# Patient Record
Sex: MALE | Race: WHITE | URBAN - METROPOLITAN AREA
[De-identification: names, ages, dates, MRNs, and addresses within clinical notes are randomized per-mention and may not be internally consistent; named-entity substitution may affect disease eponyms.]

---

## 2021-04-23 ENCOUNTER — TELEPHONE (OUTPATIENT)
Dept: CARDIOLOGY CLINIC | Age: 26
End: 2021-04-23

## 2021-04-23 ENCOUNTER — OFFICE VISIT (OUTPATIENT)
Dept: CARDIOLOGY CLINIC | Age: 26
End: 2021-04-23
Payer: COMMERCIAL

## 2021-04-23 VITALS
SYSTOLIC BLOOD PRESSURE: 140 MMHG | RESPIRATION RATE: 14 BRPM | HEIGHT: 71 IN | BODY MASS INDEX: 27.72 KG/M2 | OXYGEN SATURATION: 99 % | WEIGHT: 198 LBS | HEART RATE: 95 BPM | DIASTOLIC BLOOD PRESSURE: 70 MMHG

## 2021-04-23 DIAGNOSIS — R55 VASOVAGAL SYNCOPE: Primary | ICD-10-CM

## 2021-04-23 DIAGNOSIS — R94.31 ABNORMAL EKG: ICD-10-CM

## 2021-04-23 DIAGNOSIS — Z09 HOSPITAL DISCHARGE FOLLOW-UP: ICD-10-CM

## 2021-04-23 DIAGNOSIS — R03.0 ELEVATED BP WITHOUT DIAGNOSIS OF HYPERTENSION: ICD-10-CM

## 2021-04-23 PROCEDURE — 99204 OFFICE O/P NEW MOD 45 MIN: CPT | Performed by: SPECIALIST

## 2021-04-23 PROCEDURE — 1111F DSCHRG MED/CURRENT MED MERGE: CPT | Performed by: SPECIALIST

## 2021-04-23 RX ORDER — ASCORBIC ACID 500 MG
500 TABLET ORAL DAILY
COMMUNITY

## 2021-04-23 RX ORDER — LOSARTAN POTASSIUM 25 MG/1
1 TABLET ORAL DAILY
COMMUNITY
Start: 2021-04-15 | End: 2021-05-12

## 2021-04-23 NOTE — TELEPHONE ENCOUNTER
----- Message from Bhargavi Polo RN sent at 4/23/2021 11:55 AM EDT -----  Can you order a 14 day loop for syncope?

## 2021-04-23 NOTE — PROGRESS NOTES
Aniyah Kurtz     1995       Kaitlin Samuel MD, [facility] [place]  Date of Visit-4/23/2021   PCP is Kwaku Solomon MD   [facility] [place]  [facility]  HPI:  Aniyah Kurtz is a 30 y.o. male   An EKG on 4/15/21 showed voltage criteria for LVH but not that is not readable for someone under 28. Had syncope in early April. Had been dehydrated, had elevated BP, and was put on Losartan. He saw Dr. Jose Thomas. Had a HGB of 15, creatinine of 1. . Pt had gone to [address] [place] [address] after starting Losartan on 4/16/21. Lab work was similar. EKG showed SR and had normal troponins. Pt is accompanied by his parents. Overall the pt states he is doing well. He had a syncopal episode the Monday after Easter. He was standing talking to coworkers and started to feel dizzy accompanied by tunnel vision and sweating. He does not remember falling but woke up a few seconds later with a bump on his head. He then went to his office to lay down with his feet above his head. He reports that he felt foggy after the episode. He states that the weekend before the episode he had been drinking more alcohol than normal and was not drinking as much water. He played soccer in college and after a game he felt a similar presyncopal episode when he was in a hot shower. He was also dehydrated that day. He notes he gets redness in his legs after standing for long periods. He exercises 3-4x a week. He uses a [employer] and plays indoor soccer. Home BP's run 107-131, average is about 120. He has not gotten the COVID vaccine. Denies chest pain, edema, or shortness of breath at rest, has no tachycardia, palpitations or sense of arrhythmia. Assessment/Plan:     The primary encounter diagnosis was Vasovagal syncope. Diagnoses of Abnormal EKG, Hospital discharge follow-up, and Elevated BP without diagnosis of hypertension were also pertinent to this visit. 1. Likely vasovagal syncope  He had a good prodrome.  We went over the mechanisms. I will get an echo to make sure he doesn't have athletic heart. 2. LVH is not diagnostic on an EKG before the age of 28.     3. Once I get an echo I will have him stop Losartan and keep a home diary. Will also get a loop monitor and have him f/u in 1 month. F/u in 3-4 weeks  Patient Instructions   You have been scheduled for an exercise nuclear stress test, an echocardiogram and a loop monitor has been ordered for you and will be mailed to your home. Nuclear:    Please arrive 15 minutes prior to your appointment. Wear comfortable clothing and walking or athletic shoes. Do not eat or drink anything, except water, for at least 2 hours prior to your appointment. Avoid tobacco products for at least 6 hours prior to your test.    Do not eat or drink anything containing caffeine, including but not limited to the following: chocolate, regular and decaffeinated coffee, soft drinks, or tea for at least 12-24 hours prior to your test.    Do not hold your scheduled medications prior to your test.     Your test will be performed on a 1 day protocol. This is determined by your height, weight, and other risk factors. For a 1 day test, please allow for 4 hours in the office that day. No future appointments. Impression:   1. Vasovagal syncope    2. Abnormal EKG    3. Hospital discharge follow-up    4. Elevated BP without diagnosis of hypertension       Cardiac History:   No specialty comments available. He goes by [employer] and  he lives in [place] he has been on losartan since April 16. He has no allergies to meds  He had a reaction to losartan with the emergency room on 4/16/2021. No prior cath blood transfusion ulcer or GERD. Social history = non-smoker drinks social alcohol 1 cup of coffee daily works a  enjoys sports and lives with his parents. Family history parents and brothers are in good health he has 1 sister who is a type I diabetic.     Review of systems positive for passing out fainted    Records from [facility] from 4/16/2021 are reviewed including lab work and testing. EKG is also reviewed he also has lab work with him from 4/9/2021 with  which is reviewed and to be scanned given EKG from 4/15/2021 that shows left atrial enlargement and LVH in the report but that again in this age group is not diagnostic and then have Dr. Eusebio Cordero note from 4/15/2021 which is reviewed. ROS-except as noted above. . A complete cardiac and respiratory are reviewed and negative except as above ; Resp-denies wheezing  or productive cough,. Const- No unusual weight loss or fever; Neuro-no recent seizure or CVA ; GI- No BRBPR, abdom pain, bloating ; - no  hematuria   see supplement sheet, initialed and to be scanned by staff  No past medical history on file. Social Hx= reports that he has never smoked. His smokeless tobacco use includes chew. He reports previous alcohol use. He reports that he does not use drugs. Exam and Labs:  BP (!) 140/70 (BP 1 Location: Right arm, BP Patient Position: Sitting, BP Cuff Size: Adult)   Pulse 95   Resp 14   Ht 5' 11\" (1.803 m)   Wt 198 lb (89.8 kg)   SpO2 99%   BMI 27.62 kg/m² Constitutional:  NAD, comfortable  Head: NC,AT. Eyes: No scleral icterus. Neck: no bruit  Neck supple. No JVD present. Throat: moist mucous membranes. Chest: Effort normal & normal respiratory excursion . Neurological: alert, conversant and oriented . Skin: Skin is not cold. No obvious systemic rash noted. Not diaphoretic. No erythema. Psychiatric:  Grossly normal mood and affect. Behavior appears normal. Extremities:  no clubbing or cyanosis. Abdomen: normal palpable mid-line aorta non distended    Lungs:breath sounds normal. No stridor. distress, wheezes or  Rales. Heart: normal rate, regular rhythm, normal S1, S2, no murmurs, rubs, clicks or gallops , PMI non displaced. Edema: Edema is none.   No results found for: CHOL, [address], [facility], [address], HDL, HDLP, LDL, LDLC, DLDLP, TGLX, TRIGL, TRIGP, CHHD, CHHDX  No results found for: NA, K, CL, CO2, AGAP, GLU, BUN, CREA, BUCR, GFRAA, GFRNA, CA, GFRAA   Wt Readings from Last 3 Encounters:   04/23/21 198 lb (89.8 kg)      BP Readings from Last 3 Encounters:   04/23/21 (!) 140/70      Current Outpatient Medications   Medication Sig    losartan (COZAAR) 25 mg tablet Take 1 Tab by mouth daily.  ascorbic acid, vitamin C, (Vitamin C) 500 mg tablet Take 500 mg by mouth daily. Held for the last week but plans to resume     No current facility-administered medications for this visit. Impression see above.       Written by Tete Sharpe, as dictated by Fatemeh Escalante MD.

## 2021-04-23 NOTE — PATIENT INSTRUCTIONS
You have been scheduled for an exercise nuclear stress test, an echocardiogram and a loop monitor has been ordered for you and will be mailed to your home. Nuclear: 
 
Please arrive 15 minutes prior to your appointment. Wear comfortable clothing and walking or athletic shoes. Do not eat or drink anything, except water, for at least 2 hours prior to your appointment. Avoid tobacco products for at least 6 hours prior to your test. 
 
Do not eat or drink anything containing caffeine, including but not limited to the following: chocolate, regular and decaffeinated coffee, soft drinks, or tea for at least 12-24 hours prior to your test. 
 
Do not hold your scheduled medications prior to your test.  
 
Your test will be performed on a 1 day protocol. This is determined by your height, weight, and other risk factors. For a 1 day test, please allow for 4 hours in the office that day.

## 2021-04-23 NOTE — Clinical Note
4/23/2021 Patient: Apple Ruiz YOB: 1995 Date of Visit: 4/23/2021 Amy Walls MD 
[place] [id number] Via Fax: [phone] Dear Amy Walls MD, Thank you for referring Mr. Apple Ruiz to [address] [place] [address] for evaluation. My notes for this consultation are attached. If you have questions, please do not hesitate to call me. I look forward to following your patient along with you.  
 
 
Sincerely, 
 
Magdalena Jewell MD

## 2021-05-11 ENCOUNTER — ANCILLARY PROCEDURE (OUTPATIENT)
Dept: CARDIOLOGY CLINIC | Age: 26
End: 2021-05-11

## 2021-05-11 ENCOUNTER — ANCILLARY PROCEDURE (OUTPATIENT)
Dept: CARDIOLOGY CLINIC | Age: 26
End: 2021-05-11
Payer: COMMERCIAL

## 2021-05-11 VITALS
WEIGHT: 198 LBS | BODY MASS INDEX: 27.72 KG/M2 | SYSTOLIC BLOOD PRESSURE: 130 MMHG | DIASTOLIC BLOOD PRESSURE: 78 MMHG | HEIGHT: 71 IN

## 2021-05-11 VITALS — HEIGHT: 71 IN | WEIGHT: 198 LBS | BODY MASS INDEX: 27.72 KG/M2

## 2021-05-11 DIAGNOSIS — I49.9 IRREGULAR HEART RATE: ICD-10-CM

## 2021-05-11 DIAGNOSIS — R55 SYNCOPE, UNSPECIFIED SYNCOPE TYPE: ICD-10-CM

## 2021-05-11 LAB
ECHO AO ROOT DIAM: 3.34 CM
ECHO AV AREA PEAK VELOCITY: 4.7 CM2
ECHO AV AREA VTI: 5.01 CM2
ECHO AV AREA/BSA PEAK VELOCITY: 2.2 CM2/M2
ECHO AV AREA/BSA VTI: 2.4 CM2/M2
ECHO AV MEAN GRADIENT: 3.33 MMHG
ECHO AV PEAK GRADIENT: 6.18 MMHG
ECHO AV PEAK VELOCITY: 124.3 CM/S
ECHO AV VTI: 22.87 CM
ECHO LA AREA 4C: 20.95 CM2
ECHO LA MAJOR AXIS: 4.17 CM
ECHO LA MINOR AXIS: 1.99 CM
ECHO LA VOL 2C: 56.37 ML (ref 18–58)
ECHO LA VOL 4C: 59.85 ML (ref 18–58)
ECHO LA VOL BP: 66.06 ML (ref 18–58)
ECHO LA VOL/BSA BIPLANE: 31.46 ML/M2 (ref 16–28)
ECHO LA VOLUME INDEX A2C: 26.84 ML/M2 (ref 16–28)
ECHO LA VOLUME INDEX A4C: 28.5 ML/M2 (ref 16–28)
ECHO LV E' LATERAL VELOCITY: 19.94 CM/S
ECHO LV E' SEPTAL VELOCITY: 13.07 CM/S
ECHO LV EDV A2C: 167.67 ML
ECHO LV EDV A4C: 162.19 ML
ECHO LV EDV BP: 173.66 ML (ref 67–155)
ECHO LV EDV INDEX A4C: 77.2 ML/M2
ECHO LV EDV INDEX BP: 82.7 ML/M2
ECHO LV EDV NDEX A2C: 79.8 ML/M2
ECHO LV EJECTION FRACTION A2C: 55 PERCENT
ECHO LV EJECTION FRACTION A4C: 62 PERCENT
ECHO LV EJECTION FRACTION BIPLANE: 57.5 PERCENT (ref 55–100)
ECHO LV ESV A2C: 75.98 ML
ECHO LV ESV A4C: 61.31 ML
ECHO LV ESV BP: 73.73 ML (ref 22–58)
ECHO LV ESV INDEX A2C: 36.2 ML/M2
ECHO LV ESV INDEX A4C: 29.2 ML/M2
ECHO LV ESV INDEX BP: 35.1 ML/M2
ECHO LV INTERNAL DIMENSION DIASTOLIC: 5.65 CM (ref 4.2–5.9)
ECHO LV INTERNAL DIMENSION SYSTOLIC: 3.71 CM
ECHO LV IVSD: 0.99 CM (ref 0.6–1)
ECHO LV MASS 2D: 201.5 G (ref 88–224)
ECHO LV MASS INDEX 2D: 96 G/M2 (ref 49–115)
ECHO LV POSTERIOR WALL DIASTOLIC: 0.86 CM (ref 0.6–1)
ECHO LVOT DIAM: 2.6 CM
ECHO LVOT PEAK GRADIENT: 4.83 MMHG
ECHO LVOT PEAK VELOCITY: 109.94 CM/S
ECHO LVOT SV: 114.5 ML
ECHO LVOT VTI: 21.55 CM
ECHO MV A VELOCITY: 54.54 CM/S
ECHO MV AREA PHT: 5.31 CM2
ECHO MV E DECELERATION TIME (DT): 142.78 MS
ECHO MV E VELOCITY: 77.74 CM/S
ECHO MV E/A RATIO: 1.43
ECHO MV E/E' LATERAL: 3.9
ECHO MV E/E' RATIO (AVERAGED): 4.92
ECHO MV E/E' SEPTAL: 5.95
ECHO MV PRESSURE HALF TIME (PHT): 41.4 MS
ECHO RA AREA 4C: 16.27 CM2
ECHO RV INTERNAL DIMENSION: 3.79 CM
ECHO RV TAPSE: 2.57 CM (ref 1.5–2)
LA VOL DISK BP: 58.73 ML (ref 18–58)
STRESS BASELINE DIAS BP: 76 MMHG
STRESS BASELINE HR: 73 BPM
STRESS BASELINE SYS BP: 128 MMHG
STRESS ESTIMATED WORKLOAD: 17.2 METS
STRESS EXERCISE DUR MIN: NORMAL MIN:SEC
STRESS O2 SAT PEAK: 100 %
STRESS O2 SAT REST: 96 %
STRESS PEAK DIAS BP: 62 MMHG
STRESS PEAK SYS BP: 178 MMHG
STRESS PERCENT HR ACHIEVED: 91 %
STRESS POST PEAK HR: 176 BPM
STRESS RATE PRESSURE PRODUCT: NORMAL BPM*MMHG
STRESS ST DEPRESSION: 0 MM
STRESS ST ELEVATION: 0 MM
STRESS TARGET HR: 193 BPM

## 2021-05-11 PROCEDURE — A9500 TC99M SESTAMIBI: HCPCS | Performed by: SPECIALIST

## 2021-05-11 PROCEDURE — 93306 TTE W/DOPPLER COMPLETE: CPT | Performed by: SPECIALIST

## 2021-05-11 PROCEDURE — 78452 HT MUSCLE IMAGE SPECT MULT: CPT | Performed by: SPECIALIST

## 2021-05-11 PROCEDURE — 93015 CV STRESS TEST SUPVJ I&R: CPT | Performed by: SPECIALIST

## 2021-05-11 RX ORDER — TETRAKIS(2-METHOXYISOBUTYLISOCYANIDE)COPPER(I) TETRAFLUOROBORATE 1 MG/ML
10 INJECTION, POWDER, LYOPHILIZED, FOR SOLUTION INTRAVENOUS ONCE
Status: COMPLETED | OUTPATIENT
Start: 2021-05-11 | End: 2021-05-11

## 2021-05-11 RX ORDER — TETRAKIS(2-METHOXYISOBUTYLISOCYANIDE)COPPER(I) TETRAFLUOROBORATE 1 MG/ML
30 INJECTION, POWDER, LYOPHILIZED, FOR SOLUTION INTRAVENOUS ONCE
Status: COMPLETED | OUTPATIENT
Start: 2021-05-11 | End: 2021-05-11

## 2021-05-11 RX ADMIN — TETRAKIS(2-METHOXYISOBUTYLISOCYANIDE)COPPER(I) TETRAFLUOROBORATE 27.9 MILLICURIE: 1 INJECTION, POWDER, LYOPHILIZED, FOR SOLUTION INTRAVENOUS at 09:50

## 2021-05-11 RX ADMIN — TETRAKIS(2-METHOXYISOBUTYLISOCYANIDE)COPPER(I) TETRAFLUOROBORATE 8 MILLICURIE: 1 INJECTION, POWDER, LYOPHILIZED, FOR SOLUTION INTRAVENOUS at 08:20

## 2021-05-11 NOTE — PROGRESS NOTES
His echocardiogram and stress nuclear are both normal.  The loop is pending's. Based on the results of this I have no limitation on continuing to engage in physical activity including sports. He can stop the losartan and now maintain a blood pressure monitor 4-5 times a week checking blood pressures at random times with proper technique. The goal is an average systolic top number blood pressure below 135. 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  Nurse to call pt for test result .

## 2021-05-12 ENCOUNTER — TELEPHONE (OUTPATIENT)
Dept: CARDIOLOGY CLINIC | Age: 26
End: 2021-05-12

## 2021-05-12 NOTE — PROGRESS NOTES
Two patient identifiers verified. Per MD patient called and given results. Instructed he may engage in physical activities with no limitations, stop his losartan, keep an eye on his BP and stay hydrated. Also let him know we will call with loop results. Patient verbalized understanding and denies any further questions or concerns at this time.

## 2021-05-18 NOTE — TELEPHONE ENCOUNTER
14-day event monitor 1 from April 27 to May 10 shows sinus rhythm at baseline at 96 there were no critical or serious events. Therefore stable events that were automatically tach to sinus tachycardia or sinus rhythm. There was no atrial fibrillation the average heart was 66 heart rate very between 39 and 178 the heart was 43% and bradycardia range 5% in tachycardia range.

## 2021-05-18 NOTE — TELEPHONE ENCOUNTER
Monitor shows normal variation of heart rate with some periods of sinus tachycardia which are likely when he is exercising. These occurred on 4/29/21 at 5:24 PM and on 5/6/2021 at 5:47 PM.  Overall no pathology has been seen by testing and he can resume full activities for sports. Let me know when you  talk to him if he needs any further letters or  Documentation.   Future Appointments   Date Time Provider Isaac [place]   5/20/2021  4:00 PM MD SHADY Pritchett AMB    keep fu to discuss

## 2021-05-19 NOTE — TELEPHONE ENCOUNTER
Attempted to reach patient by telephone. A message was left stating he may return my call or we will just see him tomorrow at follow up.

## 2021-05-20 ENCOUNTER — OFFICE VISIT (OUTPATIENT)
Dept: CARDIOLOGY CLINIC | Age: 26
End: 2021-05-20
Payer: COMMERCIAL

## 2021-05-20 VITALS
OXYGEN SATURATION: 100 % | DIASTOLIC BLOOD PRESSURE: 80 MMHG | RESPIRATION RATE: 16 BRPM | SYSTOLIC BLOOD PRESSURE: 142 MMHG | BODY MASS INDEX: 27.02 KG/M2 | HEIGHT: 71 IN | HEART RATE: 85 BPM | WEIGHT: 193 LBS

## 2021-05-20 DIAGNOSIS — R55 VASOVAGAL SYNCOPE: Primary | ICD-10-CM

## 2021-05-20 PROCEDURE — 99213 OFFICE O/P EST LOW 20 MIN: CPT | Performed by: SPECIALIST

## 2021-05-20 NOTE — PROGRESS NOTES
Cristofer Resendiz     1995       Kaitlin Griffiths MD, [facility] [place]  Date of Visit-5/20/2021   PCP is Joseph Everett MD   [facility] [place]  [facility]  HPI:  Cristofer Resendiz is a 30 y.o. male   1 month f/u. An EKG on 4/15/21 showed voltage criteria for LVH but not that is not readable for someone under 35. Had syncope in early April. Had been dehydrated, had elevated BP, and was put on Losartan. He saw Dr. Reggie Banuelos. Had a HGB of 15, creatinine of 1. . Pt had gone to [facility] [facility] [employer] after starting Losartan on 4/16/21. Lab work was similar. EKG showed SR and had normal troponins. Echo and stress nuclear were normal. He had a loop monitor which also showed SR or ST. 14-day event monitor 1 from April 27 to May 10 shows sinus rhythm at baseline at 96 there were no critical or serious events. Therefore stable events that were automatically tach to sinus tachycardia or sinus rhythm. There was no atrial fibrillation the average heart was 66 heart rate very between 39 and 178 the heart was 43% and bradycardia range 5% in tachycardia range. Overall the pt states he is doing well. He notes he has been feeling much better since stopping Losartan. He states he occasionally feels \"foggy\" when he wakes up in the morning. He reports feeling a \"tingling\" sensation throughout his body when flying, but he notes that flying gives him anxiety. Home BP's in the 105-127 range and HR's in the 60-80 range. Denies chest pain, edema, syncope or shortness of breath at rest, has no tachycardia, palpitations or sense of arrhythmia. Assessment/Plan:     1. Vasovagal syncope  Testing has been normal. Doing great off of Losartan. I think his problem is more low BP at times and have encouraged hydration and avoiding stressful situations. He is doing well and can resume sports activities. I would avoid antihypertensives in him unless he has a sustained high BP by home monitoring.         F/u PRN  There are no Patient Instructions on file for this visit. Future Appointments   Date Time Provider Isaac [place]   6/28/2021  8:40 AM Kaitlin Colby MD [facility] AMB          Impression:   1. Vasovagal syncope       Cardiac History:   No specialty comments available. 05/11/21    ECHO ADULT COMPLETE 05/11/2021 5/11/2021    Interpretation Summary  · LV: Estimated LVEF is 60 - 65%. Biplane method used to measure ejection fraction. Normal cavity size, wall thickness, systolic function (ejection fraction normal) and diastolic function. Wall motion: normal.    within normal limits    Signed by: Shar Salazar MD on 5/11/2021  4:37 PM    05/11/21    NUCLEAR CARDIAC STRESS TEST 05/11/2021 5/17/2021    Interpretation Summary  · SPECT: Left ventricular function post-stress was normal. Calculated ejection fraction is 53%. There is no evidence of transient ischemic dilation (TID). · Baseline ECG: Normal sinus rhythm. Increased R wave voltages but in a patient of this age LVH is not interpretable  · SPECT: Left ventricular perfusion is normal. Myocardial perfusion imaging supports a low risk stress test.    1. Normal exercise nuclear study,  prone imaging supports normal stress perfusion. 2. Normal EKG response to exercise with no chest pain. 3. Normal left ventricular systolic function with an EF of 53%    Signed by: Shar Salazar MD on 5/11/2021  4:15 PM      No future appointments. ROS-except as noted above. . A complete cardiac and respiratory are reviewed and negative except as above ; Resp-denies wheezing  or productive cough,. Const- No unusual weight loss or fever; Neuro-no recent seizure or CVA ; GI- No BRBPR, abdom pain, bloating ; - no  hematuria   see supplement sheet, initialed and to be scanned by staff  No past medical history on file. Social Hx= reports that he has never smoked. His smokeless tobacco use includes chew. He reports previous alcohol use.  He reports that he does not use drugs. Exam and Labs:  BP (!) 142/80 (BP 1 Location: Left arm, BP Patient Position: Sitting, BP Cuff Size: Adult)   Pulse 85   Resp 16   Ht 5' 11\" (1.803 m)   Wt 193 lb (87.5 kg)   SpO2 100%   BMI 26.92 kg/m² Constitutional:  NAD, comfortable  Head: NC,AT. Eyes: No scleral icterus. Neck:  Neck supple. No JVD present. Throat: moist mucous membranes. Chest: Effort normal & normal respiratory excursion . Neurological: alert, conversant and oriented . Skin: Skin is not cold. No obvious systemic rash noted. Not diaphoretic. No erythema. Psychiatric:  Grossly normal mood and affect. Behavior appears normal. Extremities:  no clubbing or cyanosis. Abdomen: non distended    Lungs:breath sounds normal. No stridor. distress, wheezes or  Rales. Heart: normal rate, regular rhythm, normal S1, S2, no murmurs, rubs, clicks or gallops , PMI non displaced. Edema: Edema is none. No results found for: CHOL, CHOLX, CHLST, CHOLV, HDL, HDLP, LDL, LDLC, DLDLP, TGLX, TRIGL, TRIGP, CHHD, CHHDX  No results found for: NA, K, CL, CO2, AGAP, GLU, BUN, CREA, BUCR, GFRAA, GFRNA, CA, GFRAA   Wt Readings from Last 3 Encounters:   05/20/21 193 lb (87.5 kg)   05/11/21 198 lb (89.8 kg)   05/11/21 198 lb (89.8 kg)      BP Readings from Last 3 Encounters:   05/20/21 (!) 142/80   05/11/21 130/78   04/23/21 (!) 140/70      Current Outpatient Medications   Medication Sig    ascorbic acid, vitamin C, (Vitamin C) 500 mg tablet Take 500 mg by mouth daily. Held for the last week but plans to resume (Patient not taking: Reported on 5/20/2021)     No current facility-administered medications for this visit. Impression see above.       Written by Wilhelminia Denver, as dictated by Linda Roy MD.

## 2021-05-20 NOTE — Clinical Note
5/20/2021 Patient: Kyrie Mann YOB: 1995 Date of Visit: 5/20/2021 Byron Reyes MD 
[facility] [address] [place] [id number] Via Fax: [phone] Dear Byron Reyes MD, Thank you for referring Mr. Kyrie Mann to [address]  for evaluation. My notes for this consultation are attached. If you have questions, please do not hesitate to call me. I look forward to following your patient along with you.  
 
 
Sincerely, 
 
Debra Vanegas MD

## 2021-06-28 ENCOUNTER — OFFICE VISIT (OUTPATIENT)
Dept: CARDIOLOGY CLINIC | Age: 26
End: 2021-06-28
Payer: COMMERCIAL

## 2021-06-28 VITALS — OXYGEN SATURATION: 100 % | BODY MASS INDEX: 26.88 KG/M2 | HEIGHT: 71 IN | RESPIRATION RATE: 18 BRPM | WEIGHT: 192 LBS

## 2021-06-28 DIAGNOSIS — R03.0 ELEVATED BP WITHOUT DIAGNOSIS OF HYPERTENSION: ICD-10-CM

## 2021-06-28 DIAGNOSIS — R55 VASOVAGAL SYNCOPE: Primary | ICD-10-CM

## 2021-06-28 PROCEDURE — 99213 OFFICE O/P EST LOW 20 MIN: CPT | Performed by: SPECIALIST

## 2021-06-28 NOTE — PROGRESS NOTES
Davion Mckinnon     1995       Kaitlin Bay MD, [facility] [place]  Date of Visit-6/28/2021   PCP is Ai Fitzgerald MD   [facility] [place]  [facility]  HPI:  Davion Mckinnon is a 30 y.o. male   1 month f/u. An EKG on 4/15/21 showed voltage criteria for LVH but not that is not readable for someone under 35. Had syncope in early April. Had been dehydrated, had elevated BP, and was put on Losartan. He saw Dr. Angelita Quinn. Had a HGB of 15, creatinine of 1. . Pt had gone to [employer] [facility] after starting Losartan on 4/16/21. Lab work was similar. EKG showed SR and had normal troponins.      Echo and stress nuclear were normal. He had a loop monitor which also showed SR or ST. 14-day event monitor 1 from April 27 to May 10 shows sinus rhythm at baseline at 96 there were no critical or serious events.  Therefore stable events that were automatically tach to sinus tachycardia or sinus rhythm.  There was no atrial fibrillation the average heart was 66 heart rate very between 39 and 178 the heart was 43% and bradycardia range 5% in tachycardia range. His issue appeared to be lower BP and he did well off Losartan when seen on 5/20/21. He sent a message on 5/28 noting some left pectoral discomfort and then arm tingling. He is here today to discuss low BP episodes. Overall the pt states he is doing well. He states that he still has minimal arm discomfort occasionally. 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 He states that he may have had a concussion from playing soccer, and that he has nerve damage in one of his arms. See scanned blood pressure diary  Denies chest pain, edema, syncope or shortness of breath at rest, has no tachycardia, palpitations or sense of arrhythmia. Assessment/Plan:     1. Vasovagal syncope  Original episode was likely related to dehydration. Feels well since then.  [employer] over his left arm tingling which has almost completely resolved. 2. Elevated BP without diagnosis of hypertension  No LVH on echo. Home BP diary reviewed and is normal.   See scanned blood pressure diary, note patient went to primary care physician talk about anxiety headache and feeling off balance and feels like he is improving    F/u PRN  There are no Patient Instructions on file for this visit. No future appointments. 05/11/21    ECHO ADULT COMPLETE 05/11/2021 5/11/2021    Interpretation Summary  · LV: Estimated LVEF is 60 - 65%. Biplane method used to measure ejection fraction. Normal cavity size, wall thickness, systolic function (ejection fraction normal) and diastolic function. Wall motion: normal.    within normal limits    Signed by: Wandy Marin MD on 5/11/2021  4:37 PM    05/11/21    NUCLEAR CARDIAC STRESS TEST 05/11/2021 5/17/2021    Interpretation Summary  · SPECT: Left ventricular function post-stress was normal. Calculated ejection fraction is 53%. There is no evidence of transient ischemic dilation (TID). · Baseline ECG: Normal sinus rhythm. Increased R wave voltages but in a patient of this age LVH is not interpretable  · SPECT: Left ventricular perfusion is normal. Myocardial perfusion imaging supports a low risk stress test.    1. Normal exercise nuclear study,  prone imaging supports normal stress perfusion. 2. Normal EKG response to exercise with no chest pain. 3. Normal left ventricular systolic function with an EF of 53%    Signed by: Wandy Marin MD on 5/11/2021  4:15 PM         Impression:   1. Vasovagal syncope    2. Elevated BP without diagnosis of hypertension         ROS-except as noted above. . A complete cardiac and respiratory are reviewed and negative except as above ; Resp-denies wheezing  or productive cough,.  Const- No unusual weight loss or fever; Neuro-no recent seizure or CVA ; GI- No BRBPR, abdom pain, bloating ; - no  hematuria   see supplement sheet, initialed and to be scanned by staff  No past medical history on file. Social Hx= reports that he has never smoked. His smokeless tobacco use includes chew. He reports previous alcohol use. He reports that he does not use drugs. Exam and Labs:  Resp 18   Ht 5' 11\" (1.803 m)   Wt 192 lb (87.1 kg)   SpO2 100%   BMI 26.78 kg/m² Constitutional:  NAD, comfortable  Head: NC,AT. Eyes: No scleral icterus. Neck:  Neck supple. No JVD present. Throat: moist mucous membranes. Chest: Effort normal & normal respiratory excursion . Neurological: alert, conversant and oriented . Skin: Skin is not cold. No obvious systemic rash noted. Not diaphoretic. No erythema. Psychiatric:  Grossly normal mood and affect. Behavior appears normal. Extremities:  no clubbing or cyanosis. Abdomen: non distended    Lungs:breath sounds normal. No stridor. distress, wheezes or  Rales. Heart: normal rate, regular rhythm, normal S1, S2, no murmurs, rubs, clicks or gallops , PMI non displaced. Edema: Edema is none. No results found for: CHOL, CHOLX, CHLST, CHOLV, HDL, HDLP, LDL, LDLC, DLDLP, TGLX, TRIGL, TRIGP, CHHD, CHHDX  No results found for: NA, K, CL, CO2, AGAP, GLU, BUN, CREA, BUCR, GFRAA, GFRNA, CA, GFRAA   Wt Readings from Last 3 Encounters:   06/28/21 192 lb (87.1 kg)   05/20/21 193 lb (87.5 kg)   05/11/21 198 lb (89.8 kg)      BP Readings from Last 3 Encounters:   05/20/21 (!) 142/80   05/11/21 130/78   04/23/21 (!) 140/70      Current Outpatient Medications   Medication Sig    ascorbic acid, vitamin C, (Vitamin C) 500 mg tablet Take 500 mg by mouth daily. Held for the last week but plans to resume (Patient not taking: Reported on 5/20/2021)     No current facility-administered medications for this visit. Impression see above.       Written by Wendelin Hashimoto, as dictated by Pooja Rivero MD.

## 2021-06-28 NOTE — Clinical Note
6/28/2021    Patient: Taty Ball   YOB: 1995   Date of Visit: 6/28/2021     MD Greg Peres [address]  [address] [facility] [address]  Via Fax: [phone]    Dear Isaac Almonte MD,      Thank you for referring Mr. Taty Ball to [address] [place] [address] for evaluation. My notes for this consultation are attached. If you have questions, please do not hesitate to call me. I look forward to following your patient along with you.       Sincerely,    Kathleen Lindsay MD

## 2023-05-15 RX ORDER — ASCORBIC ACID 500 MG
500 TABLET ORAL DAILY
COMMUNITY